# Patient Record
Sex: FEMALE | Race: WHITE
[De-identification: names, ages, dates, MRNs, and addresses within clinical notes are randomized per-mention and may not be internally consistent; named-entity substitution may affect disease eponyms.]

---
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
## 2018-06-05 NOTE — ED
GI/ HPI





- HPI Summary


HPI Summary: 





53 yr old with dysuria, frequency, and some blood in urine.  Onset of symptoms 

two  days ago. No abdominal pain or back pain. no fever or chills.  No other 

complaints. 





- History of Current Complaint


Chief Complaint: UCGU


Time Seen by Provider: 06/05/18 19:36


Stated Complaint: URINARY COMPLAINT


Hx Last Menstrual Period: 2004


Pain Intensity: 2





- Additional Pertinent History


Primary Care Physician: DULCE





- Allergy/Home Medications


Allergies/Adverse Reactions: 


 Allergies











Allergy/AdvReac Type Severity Reaction Status Date / Time


 


hydrocodone Allergy  Nausea And Verified 06/05/18 19:22





   Vomiting  


 


pineapple Allergy  Swelling Verified 06/05/18 19:22





   Of  





   Face,Lips,&  





   Throat  


 


pneumococcal vaccine Allergy  Swelling Verified 06/05/18 19:22





[From Pneumovax 23]     


 


raw tomato Allergy Intermediate THROAT Uncoded 06/05/18 19:22





   SWELLS,  





   ITCHING  


 


seasonal allergies Allergy  Eyes Uncoded 06/05/18 19:22





   Itchy/Swollen/Red/Watery  














PMH/Surg Hx/FS Hx/Imm Hx


Endocrine/Hematology History: Reports: Hx Blood Transfusions - After D&C


   Denies: Hx Diabetes, Hx Thyroid Disease


Cardiovascular History: Reports: Hx Cardiac Arrest - During D&C surgery, Other 

Cardiovascular Problems/Disorders - LYMPHEDEMA LEGS BILATERAL


   Denies: Hx Hypertension, Hx Pacemaker/ICD


Respiratory History: Reports: Hx Asthma, Hx Sleep Apnea - evaluation for 06/2014

, Other Respiratory Problems/Disorders - hx pneumothorax


   Denies: Hx Chronic Obstructive Pulmonary Disease (COPD)


GI History: Reports: Hx Gall Bladder Disease, Other GI Disorders - colitis


   Denies: Hx Cirrhosis, Hx Ulcer


 History: Reports: Other  Problems/Disorders - HYSTERECTOMY 2004


   Denies: Hx Renal Disease


Musculoskeletal History: Reports: Other Musculoskeletal History - lymphedema 

bilateral LE's


Sensory History: Reports: Hx Contacts or Glasses - Reading glasses


   Denies: Hx Hearing Aid


Opthamlomology History: Reports: Hx Contacts or Glasses - Reading glasses


Psychiatric History: Reports: Hx Depression, Hx Panic Disorder





- Cancer History


Hx Chemotherapy: No


Hx Radiation Therapy: No





- Surgical History


Surgery Procedure, Year, and Place: hysterectomy 2004.  d and c and with 

subsequent femoral artery repair and lung collapse.  GALL BLADDER REMOVAL - 11/

10/14


Hx Anesthesia Reactions: No


Infectious Disease History: No


Infectious Disease History: 


   Denies: Hx Clostridium Difficile, Hx Hepatitis, Hx Human Immunodeficiency 

Virus (HIV), Hx of Known/Suspected MRSA, Hx Shingles, Hx Tuberculosis, Hx Known/

Suspected VRE, Hx Known/Suspected VRSA, History Other Infectious Disease, 

Traveled Outside the [place] in Last 30 Days





- Family History


Known Family History: Positive: Hypertension





- Social History


Alcohol Use: Occasionally


Alcohol Amount: 1 GLASS WINE/MONTH


Substance Use Type: Reports: None


Smoking Status (MU): Former Smoker


Type: Cigarettes


Amount Used/How Often: 1/2 PPD FOR 9 YRS


Length of Time of Smoking/Using Tobacco: 8 years


Have You Smoked in the Last Year: No





Review of Systems


Constitutional: Negative


Positive: dysuria, frequency


All Other Systems Reviewed And Are Negative: Yes





Physical Exam


Triage Information Reviewed: Yes


Vital Signs On Initial Exam: 


 Initial Vitals











Temp Pulse Resp BP Pulse Ox


 


 98.9 F   69   17   112/75   98 


 


 06/05/18 19:18  06/05/18 19:18  06/05/18 19:18  06/05/18 19:18  06/05/18 19:18











Vital Signs Reviewed: Yes


Appearance: Positive: Well-Appearing, No Pain Distress


Skin: Positive: Warm, Skin Color Reflects Adequate Perfusion


Head/Face: Positive: Normal Head/Face Inspection


Eyes: Positive: EOMI


ENT: Positive: Normal ENT inspection


Neck: Positive: Nontender


Respiratory/Lung Sounds: Positive: Clear to Auscultation, Breath Sounds Present


Cardiovascular: Positive: RRR.  Negative: Murmur


Abdomen Description: Positive: Nontender.  Negative: CVA Tenderness (R), CVA 

Tenderness (L)


Musculoskeletal: Positive: Strength/ROM Intact


Neurological: Positive: Sensory/Motor Intact, Alert, Oriented to Person Place, 

Time, CN Intact II-III


Psychiatric: Positive: Normal





- Melody Coma Scale


Best Eye Response: 4 - Spontaneous


Best Motor Response: 6 - Obeys Commands


Best Verbal Response: 5 - Oriented


Coma Scale Total: 15





Diagnostics





- Vital Signs


 Vital Signs











  Temp Pulse Resp BP Pulse Ox


 


 06/05/18 19:18  98.9 F  69  17  112/75  98














- Laboratory


Lab Results: 


 Lab Results











  06/05/18 Range/Units





  19:38 


 


POC Urine Color  Other  


 


POC Urine Clarity  Turbid  


 


POC Urine pH  5.5  (5-9)  


 


POC Ur Specif Gravity  >= 1.030  (1.010-1.030)  


 


POC Urine Protein  2+ A  (Negative)  


 


POC Ur Glucose (UA)  Negative  (Negative)  


 


POC Urine Ketones  1+ A  (Negative)  


 


POC Urine Blood  3+ A  (Negative)  


 


POC Urine Nitrite  Positive A  (Negative)  


 


POC Urine Bilirubin  Negative  (Negative)  


 


POC Urine Urobilinogen  1.0  (Negative)  


 


POC U Leukocyte Esteras  2+ A  (Negative)  











Lab Statement: Any lab studies that have been ordered have been reviewed, and 

results considered in the medical decision making process.





GIGU Course/Dx





- Course


Course Of Treatment: 53 yrold with UTI.  Rx with bactrim DS.





- Diagnoses


Provider Diagnoses: 


 UTI (urinary tract infection)








Discharge





- Sign-Out/Discharge


Documenting (check all that apply): Discharge/Admit/Transfer





- Discharge Plan


Condition: Good


Disposition: HOME


Prescriptions: 


Sulfamethox/Trimethoprim DS* [Bactrim /160 TAB*] 1 tab PO BID #14 tab


Patient Education Materials:  Urinary Tract Infection in Women (ED)


Referrals: 


Edel Aparicio NP [Primary Care Provider] - 





- Billing Disposition and Condition


Condition: GOOD


Disposition: Home

## 2018-09-04 ENCOUNTER — HOSPITAL ENCOUNTER (EMERGENCY)
Dept: HOSPITAL 25 - UCCORT | Age: 53
Discharge: HOME | End: 2018-09-04
Payer: COMMERCIAL

## 2018-09-04 VITALS — SYSTOLIC BLOOD PRESSURE: 156 MMHG | DIASTOLIC BLOOD PRESSURE: 89 MMHG

## 2018-09-04 DIAGNOSIS — Z87.891: ICD-10-CM

## 2018-09-04 DIAGNOSIS — S91.332A: Primary | ICD-10-CM
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
## 2018-09-04 DIAGNOSIS — L08.9: ICD-10-CM
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
## 2018-09-04 DIAGNOSIS — Z88.5: ICD-10-CM

## 2018-09-04 PROCEDURE — G0463 HOSPITAL OUTPT CLINIC VISIT: HCPCS

## 2018-09-04 PROCEDURE — 99212 OFFICE O/P EST SF 10 MIN: CPT

## 2018-09-04 NOTE — UC
Lower Extremity/Ankle HPI





- HPI Summary


HPI Summary: 





Patient states that she stepped on a nail in her barn that went through her 

sandal yesterday.  Today, the site has gotten a little swollen and red.  She 

denies any foreign body sensation, fever and chills.  Her last tetanus is 

within the past 10 years- 7 years ago. Nail came out whole.





- History of Current Complaint


Chief Complaint: UCLowerExtremity


Stated Complaint: LEFT FOOT COMPLAINT


Time Seen by Provider: 09/04/18 14:42


Hx Obtained From: Patient


Hx Last Menstrual Period: 2004


Onset/Duration: Gradual Onset


Pain Intensity: 2


Aggravating Factor(s): Standing


Able to Bear Weight: Yes





- Allergies/Home Medications


Allergies/Adverse Reactions: 


 Allergies











Allergy/AdvReac Type Severity Reaction Status Date / Time


 


hydrocodone Allergy  Nausea And Verified 06/05/18 19:22





   Vomiting  


 


pineapple Allergy  Swelling Verified 06/05/18 19:22





   Of  





   Face,Lips,&  





   Throat  


 


pneumococcal vaccine Allergy  Swelling Verified 06/05/18 19:22





[From Pneumovax 23]     


 


raw tomato Allergy Intermediate THROAT Uncoded 06/05/18 19:22





   SWELLS,  





   ITCHING  


 


seasonal allergies Allergy  Eyes Uncoded 06/05/18 19:22





   Itchy/Swollen/Red/Watery  














PMH/Surg Hx/FS Hx/Imm Hx





- Additional Past Medical History


Additional PMH: 





Lymphedema





- Surgical History


Surgical History: Yes


Surgery Procedure, Year, and Place: hysterectomy 2004.  d and c and with 

subsequent femoral artery repair and lung collapse.  GALL BLADDER REMOVAL - 11/

10/14





- Family History


Known Family History: Positive: Hypertension





- Social History


Occupation: Employed Full-time


Alcohol Use: Occasionally


Alcohol Amount: 1 GLASS WINE/MONTH


Substance Use Type: None


Smoking Status (MU): Former Smoker


Type: Cigarettes


Amount Used/How Often: 1/2 PPD FOR 9 YRS


Length of Time of Smoking/Using Tobacco: 8 years


Have You Smoked in the Last Year: No


When Did the Patient Quit Smoking/Using Tobacco: 7 years ago





- Immunization History


Most Recent Influenza Vaccination: Not this season


Most Recent Tetanus Shot: 2011


Most Recent Pneumonia Vaccination: January 2014


Hx Tetanus, Diphtheria Vaccination: Yes


Vaccination Up to Date: Yes





Review of Systems


Constitutional: Negative


Skin: Rash - L foot red/mild swelling


Eyes: Negative


ENT: Negative


Respiratory: Negative


Cardiovascular: Negative


Gastrointestinal: Negative


Genitourinary: Negative


Motor: Negative


Neurovascular: Negative


Musculoskeletal: Negative


Neurological: Negative


Psychological: Negative


Is Patient Immunocompromised?: No


All Other Systems Reviewed And Are Negative: Yes





Physical Exam


Triage Information Reviewed: Yes


Appearance: Well-Appearing


Vital Signs: 


 Initial Vital Signs











Temp  98.5 F   09/04/18 14:40


 


Pulse  78   09/04/18 14:40


 


Resp  19   09/04/18 14:40


 


BP  156/89   09/04/18 14:40


 


Pulse Ox  99   09/04/18 14:40











Eyes: Positive: Conjunctiva Clear


ENT: Positive: Normal ENT inspection


Neck: Positive: Supple, Nontender, No Lymphadenopathy


Respiratory: Positive: Lungs clear, Normal breath sounds


Cardiovascular: Positive: RRR, No Murmur


Abdomen Description: Positive: Nontender, No Organomegaly, Soft


Bowel Sounds: Positive: Present


Musculoskeletal: Positive: ROM Intact


Neurological: Positive: Alert


Psychological: Positive: Age Appropriate Behavior


Skin Exam: Normal, Other - PW ball of L foot around base of great toe with mild 

redness and swelling. No streaking or d/c. No bony tenderness. Foot has full s/v

/m function.





Lower Extremity Course/Dx





- Course


Course Of Treatment: pt declined xray.  will tx with cipro to cover 

pseudomonas.  need for close f/u and recheck stresses.  advised pt of risk for 

tendinopathy but benefit at this time outweighs risk.





- Differential Dx/Diagnosis


Provider Diagnoses: Infected PW L foot.





Discharge





- Sign-Out/Discharge


Documenting (check all that apply): Patient Departure


All imaging exams completed and their final reports reviewed: No Studies





- Discharge Plan


Condition: Stable


Disposition: HOME


Prescriptions: 


Ciprofloxacin TAB* [Cipro 500 MG TAB*] 500 mg PO BID 10 Days #20 tab


Patient Education Materials:  Puncture Wound (ED)


Referrals: 


Edel Aparicio NP [Primary Care Provider] - 2 Days





- Billing Disposition and Condition


Condition: STABLE


Disposition: Home

## 2019-10-01 ENCOUNTER — HOSPITAL ENCOUNTER (EMERGENCY)
Dept: HOSPITAL 25 - UCCORT | Age: 54
Discharge: HOME | End: 2019-10-01
Payer: COMMERCIAL

## 2019-10-01 VITALS — DIASTOLIC BLOOD PRESSURE: 87 MMHG | SYSTOLIC BLOOD PRESSURE: 134 MMHG

## 2019-10-01 DIAGNOSIS — F17.210: ICD-10-CM

## 2019-10-01 DIAGNOSIS — Z88.5: ICD-10-CM

## 2019-10-01 DIAGNOSIS — Z88.7: ICD-10-CM

## 2019-10-01 DIAGNOSIS — R21: Primary | ICD-10-CM

## 2019-10-01 DIAGNOSIS — Z91.09: ICD-10-CM

## 2019-10-01 DIAGNOSIS — L50.9: ICD-10-CM

## 2019-10-01 DIAGNOSIS — Z91.018: ICD-10-CM

## 2019-10-01 PROCEDURE — 99212 OFFICE O/P EST SF 10 MIN: CPT

## 2019-10-01 PROCEDURE — G0463 HOSPITAL OUTPT CLINIC VISIT: HCPCS

## 2019-10-01 NOTE — UC
Skin Complaint HPI





- HPI Summary


HPI Summary: 





Patient presents urgent care for evaluation of a rapidly progressive itchy rash 

on her bilateral arms.  Patient states she mowed the lawn earlier today.  

Patient states after she mowed the lawn to [employer] and noticed a rash on her 

left lower arm.  Patient states she took a shower.  Patient states initially 

that helped but does seem to spread.  Patient took Benadryl at home with the 

itching.  Patient states in general she is alert to person but does not know 

what she came in contact with.  No facial swelling difficulty breathing 

shortness of breath or wheeze.  Patient states the rash is nowhere other than 

her bilateral arms.  Patient denies lightheadedness.  No headache.  No nausea 

vomiting.  Medications reviewed





- History of Current Complaint


Chief Complaint: UCRash


Time Seen by Provider: 10/01/19 21:56


Stated Complaint: RASH


Hx Obtained From: Patient


Hx Last Menstrual Period: 2004


Pain Intensity: 0





- Allergy/Home Medications


Allergies/Adverse Reactions: 


 Allergies











Allergy/AdvReac Type Severity Reaction Status Date / Time


 


hydrocodone Allergy  Nausea And Verified 10/01/19 21:54





   Vomiting  


 


pineapple Allergy  Swelling Verified 10/01/19 21:54





   Of  





   Face,Lips,&  





   Throat  


 


pneumococcal vaccine Allergy  Swelling Verified 10/01/19 21:54





[From Pneumovax 23]     


 


raw tomato Allergy Intermediate THROAT Uncoded 10/01/19 21:54





   SWELLS,  





   ITCHING  


 


seasonal allergies Allergy  Eyes Uncoded 10/01/19 21:54





   Itchy/Swollen/Red/Watery  














PMH/Surg Hx/FS Hx/Imm Hx


Previously Healthy: Yes





- Surgical History


Surgical History: Yes


Surgery Procedure, Year, and Place: hysterectomy 2004.  d and c and with 

subsequent femoral artery repair and lung collapse.  GALL BLADDER REMOVAL - 11/

10/14





- Family History


Known Family History: Positive: Hypertension, Non-Contributory





- Social History


Occupation: Employed Full-time


Lives: With Family


Alcohol Use: Occasionally


Alcohol Amount: 1 GLASS WINE/MONTH


Substance Use Type: None


Smoking Status (MU): Current Every Day Smoker


Type: Cigarettes


Amount Used/How Often: 4-5 cigs/day


Length of Time of Smoking/Using Tobacco: 8 years


Have You Smoked in the Last Year: No


When Did the Patient Quit Smoking/Using Tobacco: 7 years ago





- Immunization History


Most Recent Influenza Vaccination: Not this season


Most Recent Tetanus Shot: 2011


Most Recent Pneumonia Vaccination: January 2014


Hx Tetanus, Diphtheria Vaccination: Yes


Vaccination Up to Date: Yes





Review of Systems


All Other Systems Reviewed And Are Negative: Yes


Constitutional: Positive: Negative


Skin: Positive: Rash


Eyes: Positive: Negative


ENT: Positive: Negative


Respiratory: Positive: Negative





Physical Exam





- Summary


Physical Exam Summary: 





Vital Signs Reviewed: Yes


A+Ox3, itchy


Eyes; no injected 


ENT: Hearing grossly normal  mmoist


Neck: Positive: Supple


Respiratory: Positive: No respiratory distress, No accessory muscle use + CTA 

throughout  no w/r


Cardiovascular: RRR  nl s1, s2  no m/r  CBT <2  sec


abd soft + BS nt/nd  no guarding, no distension


Musculoskeletal Exam: GARCIA x 4 without difficulty Strength Intact, ROM Intact


Neurological: Positive: Alert,  + sensation throughout


Psychological: Positive: Normal Response To examiner


Skin: Positive: Pt with bilatearl erythema b/l forearms. few visible hives, no 

open lesions pruritic


Triage Information Reviewed: Yes


Vital Signs: 


 Initial Vital Signs











Temp  98.1 F   10/01/19 21:49


 


Pulse  98   10/01/19 21:49


 


Resp  18   10/01/19 21:49


 


BP  134/87   10/01/19 21:49


 


Pulse Ox  99   10/01/19 21:49














Course/Dx





- Course


Course Of Treatment: 





Patient presents to urgent care for evaluation of acute development of rash on 

bilateral forearms.  Patient states she mowed the lawn when she came in her on 

the left arm.  Patient states she took a shower to wash all patient's in the 

shower she developed itching on both forearms.  Patient denies fevers or 

chills.  Patient no facial swelling or difficulty breathing.  On exam patient 

appears uncomfortable.  Patient itching bilateral forearms.  Patient states she 

did take Benadryl prior to arrival.  Patient does have subtle hives and diffuse 

erythema along both forearms.  No other concerning findings on exam.  Discussed 

with patient reaction.  Will start patient on prednisone.  Continue Benadryl.  

Pepcid as needed.  Cool packs.  Patient comfortable with plan.  Strict return 

precautions discussed.





- Diagnoses


Provider Diagnosis: 


 Rash








Discharge ED





- Sign-Out/Discharge


Documenting (check all that apply): Patient Departure


All imaging exams completed and their final reports reviewed: No Studies





- Discharge Plan


Condition: Stable


Disposition: HOME


Prescriptions: 


predniSONE TAB* [Deltasone 20 MG TAB*] 20 mg PO DAILY #11 tab


Patient Education Materials:  Acute Rash (ED)


Referrals: 


Edel Aparicio, NP [Primary Care Provider] - 


Additional Instructions: 


- Take prednisone exactly as prescribed until gone - starting today


- Okay to take Benadryl (1-2 tablets) every 6 hours as needed. This medication 

may cause drowsiness - do NOT drive, operate machinery or drink alcohol while 

taking Benadryl


-Avoid getting over heated (hot showers, hot tubs, exercise) for at least 48 

hours


- Try to avoid aspirin, NSAIDs (Motrin, Aleve, Naprosyn) for 2-3 days


- Okay to apply cool compresses to the area of injury


-Contact your doctor or return here with questions or concerns - if you develop 

difficulty breathing, swallowing, facial swelling, blisters or other concerns 

it is recommended you go immediately to the emergency department - okay to call 

[phone]





- Billing Disposition and Condition


Condition: STABLE


Disposition: Home

## 2019-10-19 ENCOUNTER — HOSPITAL ENCOUNTER (EMERGENCY)
Dept: HOSPITAL 25 - UCCORT | Age: 54
Discharge: HOME | End: 2019-10-19
Payer: COMMERCIAL

## 2019-10-19 VITALS — DIASTOLIC BLOOD PRESSURE: 81 MMHG | SYSTOLIC BLOOD PRESSURE: 119 MMHG

## 2019-10-19 DIAGNOSIS — Z88.7: ICD-10-CM

## 2019-10-19 DIAGNOSIS — Z91.018: ICD-10-CM

## 2019-10-19 DIAGNOSIS — Z88.5: ICD-10-CM

## 2019-10-19 DIAGNOSIS — K52.9: Primary | ICD-10-CM

## 2019-10-19 DIAGNOSIS — Z91.09: ICD-10-CM

## 2019-10-19 DIAGNOSIS — F17.210: ICD-10-CM

## 2019-10-19 PROCEDURE — 87899 AGENT NOS ASSAY W/OPTIC: CPT

## 2019-10-19 PROCEDURE — G0463 HOSPITAL OUTPT CLINIC VISIT: HCPCS

## 2019-10-19 PROCEDURE — 81003 URINALYSIS AUTO W/O SCOPE: CPT

## 2019-10-19 PROCEDURE — 82272 OCCULT BLD FECES 1-3 TESTS: CPT

## 2019-10-19 PROCEDURE — 87046 STOOL CULTR AEROBIC BACT EA: CPT

## 2019-10-19 PROCEDURE — 87045 FECES CULTURE AEROBIC BACT: CPT

## 2019-10-19 PROCEDURE — 99212 OFFICE O/P EST SF 10 MIN: CPT

## 2019-10-19 NOTE — UC
Abdominal Pain Female HPI





- HPI Summary


HPI Summary: 





Pt presents with "long term abdominal pain and constant diarrhea" that has 

recently worsened over the last week with bright red blood noted in loose 

stool.  Pt states that she has a hx of possible colitis but has not followed up 

with PCP or GI provider in the last 5 years. Pt states that she regularly has 

loose stools adn manages it at home with pepto bismol and is able to control 

frequency and urgency of stool.  However, in the last 1-2 weeks she has had 

more frequent episodes of diarrhea and noticed bright red stools. Pt also 

reports burning with urination





- History of Current Complaint


Chief Complaint: UCGU


Stated Complaint: URINARY COMPLAINT


Time Seen by Provider: 10/19/19 13:37


Hx Obtained From: Patient


Hx Last Menstrual Period: 2004


Pregnant?: No


Onset/Duration: Gradual Onset, Lasting Weeks, Still Present, Worse Since - onset


Timing: Intermittent Episodes Lasting:


Severity Initially: Mild


Severity Currently: Moderate


Pain Intensity: 6


Location: Diffuse


Radiates: No


Radiates to: Back


Character: Colicy, Cramping, Dull


Aggravating Factor(s): Nothing


Alleviating Factor(s): Other: - pepto bismol


Associated Signs and Symptoms: Positive: Back Pain, Urinary Symptoms, Diarrhea





- Risk Factors


Ectopic Pregnancy Risk Factor: Negative


Ovarian Torsion Risk Factor: Negative


Allergies/Adverse Reactions: 


 Allergies











Allergy/AdvReac Type Severity Reaction Status Date / Time


 


hydrocodone Allergy  Nausea And Verified 10/19/19 13:26





   Vomiting  


 


pineapple Allergy  Swelling Verified 10/19/19 13:26





   Of  





   Face,Lips,&  





   Throat  


 


pneumococcal vaccine Allergy  Swelling Verified 10/19/19 13:26





[From Pneumovax 23]     


 


raw tomato Allergy Intermediate THROAT Uncoded 10/19/19 13:26





   SWELLS,  





   ITCHING  


 


seasonal allergies Allergy  Eyes Uncoded 10/19/19 13:26





   Itchy/Swollen/Red/Watery  











Home Medications: 


 Home Medications





NK [No Home Medications Reported]  10/19/19 [History Confirmed 10/19/19]











PMH/Surg Hx/FS Hx/Imm Hx


Previously Healthy: Yes





- Surgical History


Surgical History: Yes


Surgery Procedure, Year, and Place: hysterectomy 2004.  d and c and with 

subsequent femoral artery repair and lung collapse.  GALL BLADDER REMOVAL - 11/

10/14





- Family History


Known Family History: Positive: Hypertension, Non-Contributory





- Social History


Occupation: Employed Full-time


Lives: Alone


Alcohol Use: Occasionally


Alcohol Amount: 1 GLASS WINE/MONTH


Substance Use Type: None


Smoking Status (MU): Current Every Day Smoker


Type: Cigarettes


Amount Used/How Often: 4-5 cigs/day


Length of Time of Smoking/Using Tobacco: 8 years


Have You Smoked in the Last Year: No


When Did the Patient Quit Smoking/Using Tobacco: 7 years ago





- Immunization History


Most Recent Influenza Vaccination: Not this season


Most Recent Tetanus Shot: 2011


Most Recent Pneumonia Vaccination: January 2014


Hx Tetanus, Diphtheria Vaccination: Yes


Vaccination Up to Date: Yes





Review of Systems


All Other Systems Reviewed And Are Negative: Yes


Constitutional: Positive: Negative


Skin: Positive: Negative


Eyes: Positive: Negative


ENT: Positive: Negative


Respiratory: Positive: Negative


Cardiovascular: Positive: Negative


Gastrointestinal: Positive: Abdominal Pain, Diarrhea


Genitourinary: Positive: Dysuria


Motor: Positive: Negative


Neurovascular: Positive: Negative


Musculoskeletal: Positive: Negative


Psychological: Positive: Negative


Is Patient Immunocompromised?: No





Physical Exam


Triage Information Reviewed: Yes


Appearance: Well-Appearing, Obese


Vital Signs: 


 Initial Vital Signs











Temp  99.6 F   10/19/19 13:21


 


Pulse  81   10/19/19 13:21


 


Resp  18   10/19/19 13:21


 


BP  119/81   10/19/19 13:21


 


Pulse Ox  100   10/19/19 13:21











Vital Signs Reviewed: Yes


Eye Exam: Normal


ENT Exam: Normal


ENT: Positive: Hearing grossly normal


Dental Exam: Normal


Neck exam: Normal


Respiratory Exam: Normal


Cardiovascular Exam: Normal


Abdominal Exam: Normal


Abdomen Description: Positive: Nontender


Musculoskeletal Exam: Normal


Neurological Exam: Normal


Psychological Exam: Normal


Skin Exam: Normal





Abd Pain Female Course/Dx





- Course


Course Of Treatment: 





I discussed my concerns with the pt and recommended that she follow up 

immediately with a PCP and GI provider as soon as possible.  Pt verbalized 

understanding and agree to plan of care. 





- Differential Dx/Diagnosis


Differential Diagnosis: Diverticulitis, Irritable Bowel Syndrome, Urinary Tract 

Infection


Provider Diagnosis: 


 Gastroenteritis








Discharge ED





- Sign-Out/Discharge


Documenting (check all that apply): Patient Departure


All imaging exams completed and their final reports reviewed: No Studies





- Discharge Plan


Condition: Stable


Disposition: HOME


Patient Education Materials:  Loperamide (By mouth), Chronic Diarrhea (ED), 

Abdominal Pain (ED)


Referrals: 


Edel Aparicio NP [Primary Care Provider] - As Soon As Possible


Additional Instructions: 


Please follow up with your PCP and your Gastroenterologist provider as soon as 

possible. 





- Billing Disposition and Condition


Condition: STABLE


Disposition: Home